# Patient Record
Sex: FEMALE | Race: WHITE | NOT HISPANIC OR LATINO | Employment: OTHER | URBAN - METROPOLITAN AREA
[De-identification: names, ages, dates, MRNs, and addresses within clinical notes are randomized per-mention and may not be internally consistent; named-entity substitution may affect disease eponyms.]

---

## 2019-10-04 ENCOUNTER — APPOINTMENT (OUTPATIENT)
Dept: RADIOLOGY | Facility: MEDICAL CENTER | Age: 69
End: 2019-10-04
Attending: EMERGENCY MEDICINE
Payer: MEDICARE

## 2019-10-04 ENCOUNTER — HOSPITAL ENCOUNTER (EMERGENCY)
Facility: MEDICAL CENTER | Age: 69
End: 2019-10-04
Attending: EMERGENCY MEDICINE
Payer: MEDICARE

## 2019-10-04 VITALS
WEIGHT: 123.24 LBS | DIASTOLIC BLOOD PRESSURE: 74 MMHG | BODY MASS INDEX: 21.15 KG/M2 | RESPIRATION RATE: 16 BRPM | TEMPERATURE: 99.7 F | HEART RATE: 60 BPM | SYSTOLIC BLOOD PRESSURE: 146 MMHG | OXYGEN SATURATION: 97 %

## 2019-10-04 DIAGNOSIS — R00.1 BRADYCARDIA: ICD-10-CM

## 2019-10-04 DIAGNOSIS — R42 LIGHTHEADEDNESS: ICD-10-CM

## 2019-10-04 LAB
ALBUMIN SERPL BCP-MCNC: 4.2 G/DL (ref 3.2–4.9)
ALBUMIN/GLOB SERPL: 1.4 G/DL
ALP SERPL-CCNC: 69 U/L (ref 30–99)
ALT SERPL-CCNC: 20 U/L (ref 2–50)
ANION GAP SERPL CALC-SCNC: 13 MMOL/L (ref 0–11.9)
AST SERPL-CCNC: 27 U/L (ref 12–45)
BASOPHILS # BLD AUTO: 0.3 % (ref 0–1.8)
BASOPHILS # BLD: 0.02 K/UL (ref 0–0.12)
BILIRUB SERPL-MCNC: 0.4 MG/DL (ref 0.1–1.5)
BUN SERPL-MCNC: 14 MG/DL (ref 8–22)
CALCIUM SERPL-MCNC: 10 MG/DL (ref 8.4–10.2)
CHLORIDE SERPL-SCNC: 100 MMOL/L (ref 96–112)
CO2 SERPL-SCNC: 25 MMOL/L (ref 20–33)
CREAT SERPL-MCNC: 0.65 MG/DL (ref 0.5–1.4)
EKG IMPRESSION: NORMAL
EOSINOPHIL # BLD AUTO: 0.01 K/UL (ref 0–0.51)
EOSINOPHIL NFR BLD: 0.1 % (ref 0–6.9)
ERYTHROCYTE [DISTWIDTH] IN BLOOD BY AUTOMATED COUNT: 43.8 FL (ref 35.9–50)
GLOBULIN SER CALC-MCNC: 3.1 G/DL (ref 1.9–3.5)
GLUCOSE SERPL-MCNC: 155 MG/DL (ref 65–99)
HCT VFR BLD AUTO: 42.7 % (ref 37–47)
HGB BLD-MCNC: 14.2 G/DL (ref 12–16)
IMM GRANULOCYTES # BLD AUTO: 0.02 K/UL (ref 0–0.11)
IMM GRANULOCYTES NFR BLD AUTO: 0.3 % (ref 0–0.9)
LIPASE SERPL-CCNC: 24 U/L (ref 7–58)
LYMPHOCYTES # BLD AUTO: 1.08 K/UL (ref 1–4.8)
LYMPHOCYTES NFR BLD: 14.6 % (ref 22–41)
MCH RBC QN AUTO: 31.4 PG (ref 27–33)
MCHC RBC AUTO-ENTMCNC: 33.3 G/DL (ref 33.6–35)
MCV RBC AUTO: 94.5 FL (ref 81.4–97.8)
MONOCYTES # BLD AUTO: 0.3 K/UL (ref 0–0.85)
MONOCYTES NFR BLD AUTO: 4.1 % (ref 0–13.4)
NEUTROPHILS # BLD AUTO: 5.96 K/UL (ref 2–7.15)
NEUTROPHILS NFR BLD: 80.6 % (ref 44–72)
NRBC # BLD AUTO: 0 K/UL
NRBC BLD-RTO: 0 /100 WBC
PLATELET # BLD AUTO: 163 K/UL (ref 164–446)
PMV BLD AUTO: 10.4 FL (ref 9–12.9)
POTASSIUM SERPL-SCNC: 3.9 MMOL/L (ref 3.6–5.5)
PROT SERPL-MCNC: 7.3 G/DL (ref 6–8.2)
RBC # BLD AUTO: 4.52 M/UL (ref 4.2–5.4)
SODIUM SERPL-SCNC: 138 MMOL/L (ref 135–145)
TROPONIN T SERPL-MCNC: 6 NG/L (ref 6–19)
WBC # BLD AUTO: 7.4 K/UL (ref 4.8–10.8)

## 2019-10-04 PROCEDURE — 94760 N-INVAS EAR/PLS OXIMETRY 1: CPT

## 2019-10-04 PROCEDURE — 99285 EMERGENCY DEPT VISIT HI MDM: CPT

## 2019-10-04 PROCEDURE — 71045 X-RAY EXAM CHEST 1 VIEW: CPT

## 2019-10-04 PROCEDURE — 80053 COMPREHEN METABOLIC PANEL: CPT

## 2019-10-04 PROCEDURE — 85025 COMPLETE CBC W/AUTO DIFF WBC: CPT

## 2019-10-04 PROCEDURE — 84484 ASSAY OF TROPONIN QUANT: CPT

## 2019-10-04 PROCEDURE — 36415 COLL VENOUS BLD VENIPUNCTURE: CPT

## 2019-10-04 PROCEDURE — 93005 ELECTROCARDIOGRAM TRACING: CPT | Performed by: EMERGENCY MEDICINE

## 2019-10-04 PROCEDURE — 700105 HCHG RX REV CODE 258: Performed by: EMERGENCY MEDICINE

## 2019-10-04 PROCEDURE — 83690 ASSAY OF LIPASE: CPT

## 2019-10-04 RX ORDER — SODIUM CHLORIDE 9 MG/ML
1000 INJECTION, SOLUTION INTRAVENOUS ONCE
Status: COMPLETED | OUTPATIENT
Start: 2019-10-04 | End: 2019-10-04

## 2019-10-04 RX ORDER — SIMVASTATIN 20 MG
20 TABLET ORAL NIGHTLY
COMMUNITY

## 2019-10-04 RX ADMIN — SODIUM CHLORIDE 1000 ML: 9 INJECTION, SOLUTION INTRAVENOUS at 15:41

## 2019-10-04 NOTE — ED NOTES
MEDICATION RECONCILIATION UPDATED AND COMPLETE PER PT AT BEDSIDE  ALLERGIES HAVE BEEN VERIFIED   NO ORAL ABX WITHIN THE LAST 14 DAYS  PT HOME PHARMACY: [facility]

## 2019-10-04 NOTE — ED NOTES
Rounded on patient.  Oriented to the room and call button. Patient assessed, chart reviewed. Call light within reach, bed in lowest position. Patient updated on plan of care, no additional needs at this time. Line and labs obtained. Will continue to monitor.

## 2019-10-04 NOTE — ED PROVIDER NOTES
ED Provider Note    ED Provider    Means of arrival: Private vehicle  History obtained from: Patient  History limited by: None    CHIEF COMPLAINT  Chief Complaint   Patient presents with   • Weakness   • Dizziness       HPI  Lolis Rahman is a 68 y.o. female who presents lightheadedness, and fatigue that started yesterday.  Does not appear to be associated with change in position, or particular activity.  She has not had a syncope or near syncopal episode.  She has not had fevers chills or sweats, no nausea or vomiting.  She has a history of high cholesterol no history of hypertension or diabetes.    She is not been sick with cough cold congestion nausea vomiting or diarrhea.  She has had no chest pain or shortness of breath    REVIEW OF SYSTEMS  See HPI for further details. All other systems are negative.     PAST MEDICAL HISTORY   has a past medical history of Allergic rhinitis, History of elevated lipids, Menopause, and OA (osteoarthritis).    SOCIAL HISTORY  Social History     Tobacco Use   • Smoking status: Never Smoker   Substance and Sexual Activity   • Alcohol use: No   • Drug use: No   • Sexual activity: Yes     Partners: Male       SURGICAL HISTORY   has a past surgical history that includes laparoscopy.    CURRENT MEDICATIONS  Home Medications     Reviewed by Bautista Castro (Pharmacy Tech) on 10/04/19 at 1525  Med List Status: Complete   Medication Last Dose Status   CALCIUM PO 10/3/2019 Active   Coenzyme Q10 (COQ10) 100 MG CAPS 10/3/2019 Active   estrogens, conjugated (PREMARIN) 0.625 MG/GM Cream 10/3/2019 Active   Multiple Vitamins-Minerals (MULTIVITAMIN PO) 10/3/2019 Active   simvastatin (ZOCOR) 20 MG Tab 10/3/2019 Active                ALLERGIES  No Known Allergies    PHYSICAL EXAM  VITAL SIGNS: /61   Pulse 63   Temp 37 °C (98.6 °F)   Resp 16   Wt 55.9 kg (123 lb 3.8 oz)   SpO2 99%   BMI 21.15 kg/m²   Constitutional: Alert in no apparent distress.  HENT: No signs of trauma, Mucous  membranes are moist   Eyes:  Conjunctiva normal, Non-icteric.   Neck: Normal range of motion, No tenderness, Supple,  Lymphatic: No lymphadenopathy noted.   Cardiovascular: Regular rate and rhythm, no murmurs.   Thorax & Lungs: Normal breath sounds, No respiratory distress, No wheezing, No chest tenderness.   Abdomen: Bowel sounds normal, Soft, No tenderness, No masses, No pulsatile masses. No peritoneal signs.  Skin: Warm, Dry,Normal color  Back: No bony tenderness, No CVA tenderness.   Extremities:No edema, No tenderness, No cyanosis,    Musculoskeletal: Good range of motion in all major joints. No tenderness to palpation or major deformities noted.   Neurologic: Alert ,Oriented x4, Normal motor function, Normal sensory function, No focal deficits noted.   Psychiatric: Affect normal, Judgment normal, Mood normal.       MEDICAL DECISION MAKING  This is a 68 y.o. female who presents nonspecific Symptoms of lightheadedness and fatigue.  Her vital signs are normal there is no hypoxemia no hypotension or tachycardia.  IV fluids will be given to treat her lightheadedness for possible hypovolemia.  Lab test to be done to evaluate for electrolyte imbalance, and evaluate for anemia.  EKG be done to evaluate for arrhythmia.    DIAGNOSTIC STUDIES / PROCEDURES    EKG  Results for orders placed or performed during the hospital encounter of 10/04/19   CBC w/ Differential   Result Value Ref Range    WBC 7.4 4.8 - 10.8 K/uL    RBC 4.52 4.20 - 5.40 M/uL    Hemoglobin 14.2 12.0 - 16.0 g/dL    Hematocrit 42.7 37.0 - 47.0 %    MCV 94.5 81.4 - 97.8 fL    MCH 31.4 27.0 - 33.0 pg    MCHC 33.3 (L) 33.6 - 35.0 g/dL    RDW 43.8 35.9 - 50.0 fL    Platelet Count 163 (L) 164 - 446 K/uL    MPV 10.4 9.0 - 12.9 fL    Neutrophils-Polys 80.60 (H) 44.00 - 72.00 %    Lymphocytes 14.60 (L) 22.00 - 41.00 %    Monocytes 4.10 0.00 - 13.40 %    Eosinophils 0.10 0.00 - 6.90 %    Basophils 0.30 0.00 - 1.80 %    Immature Granulocytes 0.30 0.00 - 0.90 %     Nucleated RBC 0.00 /100 WBC    Neutrophils (Absolute) 5.96 2.00 - 7.15 K/uL    Lymphs (Absolute) 1.08 1.00 - 4.80 K/uL    Monos (Absolute) 0.30 0.00 - 0.85 K/uL    Eos (Absolute) 0.01 0.00 - 0.51 K/uL    Baso (Absolute) 0.02 0.00 - 0.12 K/uL    Immature Granulocytes (abs) 0.02 0.00 - 0.11 K/uL    NRBC (Absolute) 0.00 K/uL   Complete Metabolic Panel (CMP)   Result Value Ref Range    Sodium 138 135 - 145 mmol/L    Potassium 3.9 3.6 - 5.5 mmol/L    Chloride 100 96 - 112 mmol/L    Co2 25 20 - 33 mmol/L    Anion Gap 13.0 (H) 0.0 - 11.9    Glucose 155 (H) 65 - 99 mg/dL    Bun 14 8 - 22 mg/dL    Creatinine 0.65 0.50 - 1.40 mg/dL    Calcium 10.0 8.4 - 10.2 mg/dL    AST(SGOT) 27 12 - 45 U/L    ALT(SGPT) 20 2 - 50 U/L    Alkaline Phosphatase 69 30 - 99 U/L    Total Bilirubin 0.4 0.1 - 1.5 mg/dL    Albumin 4.2 3.2 - 4.9 g/dL    Total Protein 7.3 6.0 - 8.2 g/dL    Globulin 3.1 1.9 - 3.5 g/dL    A-G Ratio 1.4 g/dL   Troponin STAT   Result Value Ref Range    Troponin T 6 6 - 19 ng/L   Lipase   Result Value Ref Range    Lipase 24 7 - 58 U/L   ESTIMATED GFR   Result Value Ref Range    GFR If African American >60 >60 mL/min/1.73 m 2    GFR If Non African American >60 >60 mL/min/1.73 m 2   EKG   Result Value Ref Range    Report       [facility] Emergency Dept.    Test Date:  2019-10-04  Pt Name:    JOSE AYERS                 Department: [facility]  MRN:        [id number]                      [facility]  Gender:     Female                       Technician: RAVINDRA  :        1950                   Requested By:MYKEL HAMMONDS  Order #:    [id number]                    Damian MD: MYKEL HAMMONDS DKarliOKarli    Measurements  Intervals                                Axis  Rate:       55                           P:          73  DC:         135                          QRS:        11  QRSD:       102                          T:          65  QT:         448  QTc:        429    Interpretive Statements  SINUS  BRADYCARDIA  LOW VOLTAGE IN FRONTAL LEADS  RSR' IN V1 OR V2, PROBABLY NORMAL VARIANT  No previous ECG available for comparison    Electronically Signed On 10-4-2019 17:07:23 PDT by MYKEL HAMMONDS D.O.           LABS  Results for orders placed or performed during the hospital encounter of 10/04/19   CBC w/ Differential   Result Value Ref Range    WBC 7.4 4.8 - 10.8 K/uL    RBC 4.52 4.20 - 5.40 M/uL    Hemoglobin 14.2 12.0 - 16.0 g/dL    Hematocrit 42.7 37.0 - 47.0 %    MCV 94.5 81.4 - 97.8 fL    MCH 31.4 27.0 - 33.0 pg    MCHC 33.3 (L) 33.6 - 35.0 g/dL    RDW 43.8 35.9 - 50.0 fL    Platelet Count 163 (L) 164 - 446 K/uL    MPV 10.4 9.0 - 12.9 fL    Neutrophils-Polys 80.60 (H) 44.00 - 72.00 %    Lymphocytes 14.60 (L) 22.00 - 41.00 %    Monocytes 4.10 0.00 - 13.40 %    Eosinophils 0.10 0.00 - 6.90 %    Basophils 0.30 0.00 - 1.80 %    Immature Granulocytes 0.30 0.00 - 0.90 %    Nucleated RBC 0.00 /100 WBC    Neutrophils (Absolute) 5.96 2.00 - 7.15 K/uL    Lymphs (Absolute) 1.08 1.00 - 4.80 K/uL    Monos (Absolute) 0.30 0.00 - 0.85 K/uL    Eos (Absolute) 0.01 0.00 - 0.51 K/uL    Baso (Absolute) 0.02 0.00 - 0.12 K/uL    Immature Granulocytes (abs) 0.02 0.00 - 0.11 K/uL    NRBC (Absolute) 0.00 K/uL   Complete Metabolic Panel (CMP)   Result Value Ref Range    Sodium 138 135 - 145 mmol/L    Potassium 3.9 3.6 - 5.5 mmol/L    Chloride 100 96 - 112 mmol/L    Co2 25 20 - 33 mmol/L    Anion Gap 13.0 (H) 0.0 - 11.9    Glucose 155 (H) 65 - 99 mg/dL    Bun 14 8 - 22 mg/dL    Creatinine 0.65 0.50 - 1.40 mg/dL    Calcium 10.0 8.4 - 10.2 mg/dL    AST(SGOT) 27 12 - 45 U/L    ALT(SGPT) 20 2 - 50 U/L    Alkaline Phosphatase 69 30 - 99 U/L    Total Bilirubin 0.4 0.1 - 1.5 mg/dL    Albumin 4.2 3.2 - 4.9 g/dL    Total Protein 7.3 6.0 - 8.2 g/dL    Globulin 3.1 1.9 - 3.5 g/dL    A-G Ratio 1.4 g/dL   Troponin STAT   Result Value Ref Range    Troponin T 6 6 - 19 ng/L   Lipase   Result Value Ref Range    Lipase 24 7 - 58 U/L   ESTIMATED GFR    Result Value Ref Range    GFR If African American >60 >60 mL/min/1.73 m 2    GFR If Non African American >60 >60 mL/min/1.73 m 2   EKG   Result Value Ref Range    Report       [facility] Emergency Dept.    Test Date:  2019-10-04  Pt Name:    JOSE AYERS                 Department: [facility]  MRN:        [id number]                      [facility]   Gender:     Female                       Technician: RAVINDRA  :        1950                   Requested By:MYKEL HAMMONDS  Order #:    [id number]                    Reading MD: MYKEL HAMMONDS D.O.    Measurements  Intervals                                Axis  Rate:       55                           P:          73  RI:         135                          QRS:        11  QRSD:       102                          T:          65  QT:         448  QTc:        429    Interpretive Statements  SINUS BRADYCARDIA  LOW VOLTAGE IN FRONTAL LEADS  RSR' IN V1 OR V2, PROBABLY NORMAL VARIANT  No previous ECG available for comparison    Electronically Signed On 10-4-2019 17:07:23 PDT by MYKEL HAMMONDS D.O.           RADIOLOGY  DX-CHEST-PORTABLE (1 VIEW)    (Results Pending)   Chest x-ray visualized by me shows no acute disease, radiology read is pending  COURSE  Pertinent Labs & Imaging studies reviewed. (See chart for details)     PM - Patient seen and examined at bedside. Discussed plan of care,       1700 patient's lightheadedness is resolved.  The patient was sitting up, and is asymptomatic.  Watching the monitor her heart rate does drop in the 50s.  She is baseline in the 60s and previous records do show that 60s is her normal baseline.  This amount of bradycardia may be contributing to her lightheadedness but appears to be more close to the baseline.  Her EKG shows no arrhythmia.  Lab test shows no anemia no electrolyte imbalance.  Chest x-ray still pending at this is negative the patient will be discharged home.    FINAL IMPRESSION  1.  Bradycardia    2. Lightheadedness

## 2019-10-05 NOTE — DISCHARGE INSTRUCTIONS
Please drink additional fluids daily.  Activity as tolerated.  Return if your symptoms change or worsen.

## 2021-03-03 DIAGNOSIS — Z23 NEED FOR VACCINATION: ICD-10-CM

## 2022-07-30 ENCOUNTER — OFFICE VISIT (OUTPATIENT)
Dept: URGENT CARE | Facility: CLINIC | Age: 72
End: 2022-07-30
Payer: MEDICARE

## 2022-07-30 VITALS
OXYGEN SATURATION: 98 % | BODY MASS INDEX: 20.49 KG/M2 | HEART RATE: 66 BPM | WEIGHT: 120 LBS | TEMPERATURE: 97.5 F | SYSTOLIC BLOOD PRESSURE: 104 MMHG | DIASTOLIC BLOOD PRESSURE: 62 MMHG | RESPIRATION RATE: 16 BRPM | HEIGHT: 64 IN

## 2022-07-30 DIAGNOSIS — H00.012 HORDEOLUM OF RIGHT LOWER EYELID, UNSPECIFIED HORDEOLUM TYPE: ICD-10-CM

## 2022-07-30 PROCEDURE — 99203 OFFICE O/P NEW LOW 30 MIN: CPT | Performed by: PHYSICIAN ASSISTANT

## 2022-07-30 RX ORDER — GABAPENTIN 100 MG/1
100 CAPSULE ORAL
COMMUNITY
End: 2022-07-30

## 2022-07-30 RX ORDER — ERYTHROMYCIN 5 MG/G
1 OINTMENT OPHTHALMIC 4 TIMES DAILY
Qty: 1 G | Refills: 0 | Status: SHIPPED | OUTPATIENT
Start: 2022-07-30 | End: 2022-08-06

## 2022-07-30 RX ORDER — OMEGA-3S/DHA/EPA/FISH OIL 300-1000MG
CAPSULE ORAL
COMMUNITY

## 2022-07-30 RX ORDER — GABAPENTIN 100 MG/1
CAPSULE ORAL
COMMUNITY
Start: 2022-05-31

## 2022-07-30 NOTE — PROGRESS NOTES
"Subjective:   Lolis Rahman is a 71 y.o. female who presents for Eye Problem (X2 days, right eye pain, red, and swollen )  Patient presents with chief complaint of right lower eyelid swelling x2 days associated with some white discharge and matting this morning.  She denies visual acuity changes.  She denies painful EOM.  She denies sick contacts.  She has not tried any home remedies.    Medications:  CALCIUM PO  Coenzyme Q10 Caps  estrogens, conjugated Crea  gabapentin Caps  MULTIVITAMIN PO  Omega-3 Fish Oil Caps  simvastatin Tabs    Allergies:             Patient has no known allergies.    Surgical History:         Past Surgical History:   Procedure Laterality Date   • LAPAROSCOPY      fertility       Past Social Hx:  Lolis Rahman  reports that she has never smoked. She has never used smokeless tobacco. She reports that she does not drink alcohol and does not use drugs.     Past Family Hx:   Lolis Rahman family history is not on file.       Problem list, medications, and allergies reviewed by myself today in Epic.     Objective:     /62   Pulse 66   Temp 36.4 °C (97.5 °F) (Temporal)   Resp 16   Ht 1.626 m (5' 4\")   Wt 54.4 kg (120 lb)   SpO2 98%   BMI 20.60 kg/m²     Physical Exam  Eyes:      General: No visual field deficit or scleral icterus.        Right eye: Hordeolum present. No foreign body or discharge.      Extraocular Movements: Extraocular movements intact.      Conjunctiva/sclera:      Right eye: Right conjunctiva is not injected. No chemosis, exudate or hemorrhage.       Comments: No evidence of cellulitis.  No painful EOM.  Evidence of pustular erythematous nodule.               Assessment/Plan:     Diagnosis and Associated Orders:     1. Hordeolum of right lower eyelid, unspecified hordeolum type  - erythromycin 5 MG/GM Ointment; Apply 1 Application to both eyes 4 times a day for 7 days.  Dispense: 1 g; Refill: 0        Comments/MDM:    Hordeola can be managed with warm, moist compresses " placed on the affected areas frequently (eg, for 5 to 10 minutes at least twice a day) in order to facilitate drainage. Massage and gentle wiping of the affected eyelid after the warm compress can also aid in drainage. Patients should discontinue eye makeup to support healing. If, despite management with warm compresses, the lesion does not reduce in size within one to two weeks, the patient should be referred to an ophthalmologist for consideration of incision and drainage.    Patients who who do not achieve adequate improvement with warm compresses and mechanical removal of lid margin debris may respond to a topical antibiotic ointment combination.       I personally reviewed prior external notes and test results pertinent to today's visit.  Red flags discussed as well as indications to present to the Emergency Department.  Supportive care, natural history, differential diagnoses, and indications for immediate follow-up discussed.  Patient expresses understanding and agrees to plan.  Patient denies any other questions or concerns.    Follow-up with the primary care physician for recheck, reevaluation, and consideration of further management.      Please note that this dictation was created using voice recognition software. I have made a reasonable attempt to correct obvious errors, but I expect that there are errors of grammar and possibly content that I did not discover before finalizing the note.    This note was electronically signed by Ginny Koehler PA-C

## 2022-11-07 ENCOUNTER — PATIENT MESSAGE (OUTPATIENT)
Dept: HEALTH INFORMATION MANAGEMENT | Facility: OTHER | Age: 72
End: 2022-11-07